# Patient Record
Sex: MALE | Race: BLACK OR AFRICAN AMERICAN | NOT HISPANIC OR LATINO | Employment: OTHER | URBAN - NONMETROPOLITAN AREA
[De-identification: names, ages, dates, MRNs, and addresses within clinical notes are randomized per-mention and may not be internally consistent; named-entity substitution may affect disease eponyms.]

---

## 2021-10-11 ENCOUNTER — OFFICE VISIT (OUTPATIENT)
Dept: FAMILY MEDICINE | Facility: CLINIC | Age: 76
End: 2021-10-11
Payer: MEDICARE

## 2021-10-11 VITALS
HEIGHT: 74 IN | TEMPERATURE: 97 F | BODY MASS INDEX: 27.46 KG/M2 | SYSTOLIC BLOOD PRESSURE: 133 MMHG | OXYGEN SATURATION: 95 % | RESPIRATION RATE: 18 BRPM | WEIGHT: 214 LBS | HEART RATE: 65 BPM | DIASTOLIC BLOOD PRESSURE: 83 MMHG

## 2021-10-11 DIAGNOSIS — J30.9 CHRONIC ALLERGIC RHINITIS: Primary | ICD-10-CM

## 2021-10-11 PROCEDURE — 99214 OFFICE O/P EST MOD 30 MIN: CPT | Mod: ,,, | Performed by: FAMILY MEDICINE

## 2021-10-11 PROCEDURE — 99214 PR OFFICE/OUTPT VISIT, EST, LEVL IV, 30-39 MIN: ICD-10-PCS | Mod: ,,, | Performed by: FAMILY MEDICINE

## 2021-10-11 RX ORDER — LATANOPROST 50 UG/ML
SOLUTION/ DROPS OPHTHALMIC
COMMUNITY
Start: 2021-05-24

## 2021-10-11 RX ORDER — MONTELUKAST SODIUM 10 MG/1
10 TABLET ORAL NIGHTLY
Qty: 30 TABLET | Refills: 0 | Status: SHIPPED | OUTPATIENT
Start: 2021-10-11 | End: 2021-11-10

## 2021-10-11 RX ORDER — AMLODIPINE BESYLATE 10 MG/1
10 TABLET ORAL DAILY
COMMUNITY
Start: 2021-07-30 | End: 2023-10-23

## 2021-10-11 RX ORDER — DORZOLAMIDE HYDROCHLORIDE AND TIMOLOL MALEATE 20; 5 MG/ML; MG/ML
SOLUTION/ DROPS OPHTHALMIC
COMMUNITY
Start: 2021-09-16

## 2023-10-23 ENCOUNTER — OFFICE VISIT (OUTPATIENT)
Dept: FAMILY MEDICINE | Facility: CLINIC | Age: 78
End: 2023-10-23
Payer: MEDICARE

## 2023-10-23 VITALS
SYSTOLIC BLOOD PRESSURE: 160 MMHG | HEART RATE: 56 BPM | OXYGEN SATURATION: 94 % | TEMPERATURE: 99 F | DIASTOLIC BLOOD PRESSURE: 88 MMHG | RESPIRATION RATE: 18 BRPM | HEIGHT: 74 IN | BODY MASS INDEX: 26.1 KG/M2 | WEIGHT: 203.38 LBS

## 2023-10-23 DIAGNOSIS — J01.11 ACUTE RECURRENT FRONTAL SINUSITIS: Primary | ICD-10-CM

## 2023-10-23 DIAGNOSIS — J02.9 SORE THROAT: ICD-10-CM

## 2023-10-23 DIAGNOSIS — Z20.822 COVID-19 VIRUS TEST RESULT UNKNOWN: ICD-10-CM

## 2023-10-23 DIAGNOSIS — R09.81 NASAL CONGESTION: ICD-10-CM

## 2023-10-23 LAB
CTP QC/QA: YES
FLUAV AG NPH QL: NEGATIVE
FLUBV AG NPH QL: NEGATIVE
SARS-COV-2 AG RESP QL IA.RAPID: NEGATIVE

## 2023-10-23 PROCEDURE — 96372 PR INJECTION,THERAP/PROPH/DIAG2ST, IM OR SUBCUT: ICD-10-PCS | Mod: ,,, | Performed by: NURSE PRACTITIONER

## 2023-10-23 PROCEDURE — 96372 THER/PROPH/DIAG INJ SC/IM: CPT | Mod: ,,, | Performed by: NURSE PRACTITIONER

## 2023-10-23 PROCEDURE — 99214 OFFICE O/P EST MOD 30 MIN: CPT | Mod: ,,, | Performed by: NURSE PRACTITIONER

## 2023-10-23 PROCEDURE — 99214 PR OFFICE/OUTPT VISIT, EST, LEVL IV, 30-39 MIN: ICD-10-PCS | Mod: ,,, | Performed by: NURSE PRACTITIONER

## 2023-10-23 PROCEDURE — 87428 SARSCOV & INF VIR A&B AG IA: CPT | Mod: RHCUB | Performed by: NURSE PRACTITIONER

## 2023-10-23 RX ORDER — FLUTICASONE PROPIONATE 50 MCG
2 SPRAY, SUSPENSION (ML) NASAL DAILY
Qty: 16 G | Refills: 0 | Status: SHIPPED | OUTPATIENT
Start: 2023-10-23

## 2023-10-23 RX ORDER — CEFTRIAXONE 1 G/1
1 INJECTION, POWDER, FOR SOLUTION INTRAMUSCULAR; INTRAVENOUS
Status: COMPLETED | OUTPATIENT
Start: 2023-10-23 | End: 2023-10-23

## 2023-10-23 RX ORDER — AMLODIPINE BESYLATE 2.5 MG/1
2.5 TABLET ORAL
COMMUNITY
Start: 2023-10-18

## 2023-10-23 RX ORDER — METFORMIN HYDROCHLORIDE 500 MG/1
500 TABLET ORAL
COMMUNITY
Start: 2023-10-13

## 2023-10-23 RX ADMIN — CEFTRIAXONE 1 G: 1 INJECTION, POWDER, FOR SOLUTION INTRAMUSCULAR; INTRAVENOUS at 11:10

## 2023-10-23 NOTE — PROGRESS NOTES
"   Johana López DNP, MARILU    [address] [place] [address]     PATIENT NAME: Carlitos Castle  : 1945  DATE: 10/23/23  MRN: [id number]      Billing Provider: Johana López DNP, FNP  Level of Service:   Patient PCP Information       Provider PCP Type    Darwin Hatfield MD General            Reason for Visit / Chief Complaint: Sinus Problem (Complains of sinus headache, nasal congestion, runny nose in the morning. Denies cough, sore throat, and fever. States he "feels bad sometimes.")       Update PCP  Update Chief Complaint         History of Present Illness / Problem Focused Workflow     Carlitos Castle presents to the clinic with Sinus Problem (Complains of sinus headache, nasal congestion, runny nose in the morning. Denies cough, sore throat, and fever. States he "feels bad sometimes.")     Sinus Problem  Associated symptoms include congestion. Pertinent negatives include no chills, coughing, ear pain, headaches, shortness of breath or sore throat.       Review of Systems     Review of Systems   Constitutional:  Negative for activity change, appetite change, chills, fatigue and fever.   HENT:  Positive for nasal congestion, postnasal drip and rhinorrhea. Negative for ear pain, hearing loss and sore throat.    Respiratory:  Negative for cough, chest tightness, shortness of breath and wheezing.    Cardiovascular:  Negative for chest pain, palpitations, leg swelling and claudication.   Gastrointestinal:  Negative for abdominal pain, change in bowel habit, constipation, diarrhea, nausea and vomiting.   Genitourinary:  Negative for dysuria.   Musculoskeletal:  Negative for arthralgias, back pain, gait problem and myalgias.   Integumentary:  Negative for rash.   Neurological:  Negative for weakness and headaches.   Psychiatric/Behavioral:  Negative for suicidal ideas. The patient is not nervous/anxious.         Medical / Social / Family History     Past Medical " "History:   Diagnosis Date    Glaucoma     Hypertension        History reviewed. No pertinent surgical history.    Social History  Mr. Carlitos Castle  reports that he has been smoking cigars. He has been exposed to tobacco smoke. His smokeless tobacco use includes chew. He reports current alcohol use. He reports that he does not currently use drugs.    Family History  Mr. Carlitos Castle's family history includes Cancer in his father and mother.    Medications and Allergies     Medications  Outpatient Medications Marked as Taking for the 10/23/23 encounter (Office Visit) with Johana López DNP, FNP   Medication Sig Dispense Refill    amLODIPine (NORVASC) 2.5 MG tablet Take 2.5 mg by mouth.      dorzolamide-timolol 2-0.5% (COSOPT) 22.3-6.8 mg/mL ophthalmic solution       latanoprost 0.005 % ophthalmic solution       metFORMIN (GLUCOPHAGE) 500 MG tablet Take 500 mg by mouth.       Current Facility-Administered Medications for the 10/23/23 encounter (Office Visit) with Johana López DNP, FNP   Medication Dose Route Frequency Provider Last Rate Last Admin    [COMPLETED] cefTRIAXone injection 1 g  1 g Intramuscular 1 time in Clinic/HOD Johana López DNP, FNP   1 g at 10/23/23 1124       Allergies  Review of patient's allergies indicates:  No Known Allergies    Physical Examination     Vitals:    10/23/23 1029 10/23/23 1037   BP: (!) 176/88 (!) 160/88   BP Location: Right arm Left arm   Patient Position: Sitting Sitting   BP Method: Medium (Automatic) Large (Automatic)   Pulse: (!) 56    Resp: 18    Temp: 98.5 °F (36.9 °C)    TempSrc: Oral    SpO2: (!) 94%    Weight: 92.3 kg (203 lb 6.4 oz)    Height: 6' 2" (1.88 m)      Physical Exam  Vitals and nursing note reviewed.   Constitutional:       General: He is not in acute distress.     Appearance: Normal appearance. He is not ill-appearing.   HENT:      Nose: Congestion and rhinorrhea present.      Mouth/Throat:      Pharynx: No posterior oropharyngeal erythema.   Eyes: "      Extraocular Movements: Extraocular movements intact.      Pupils: Pupils are equal, round, and reactive to light.   Cardiovascular:      Rate and Rhythm: Normal rate and regular rhythm.      Heart sounds: Normal heart sounds.   Pulmonary:      Effort: Pulmonary effort is normal.      Breath sounds: Normal breath sounds.   Abdominal:      General: Bowel sounds are normal.      Palpations: Abdomen is soft.   Musculoskeletal:         General: Normal range of motion.   Skin:     Findings: No rash.   Neurological:      General: No focal deficit present.      Mental Status: He is alert and oriented to person, place, and time. Mental status is at baseline.   Psychiatric:         Mood and Affect: Mood normal.         Behavior: Behavior normal.          Assessment and Plan (including Health Maintenance)      Problem List  [employer]  Document Outside HM   :    Plan:         Health Maintenance Due   Topic Date Due    Hepatitis C Screening  Never done    Lipid Panel  Never done    COVID-19 Vaccine (1) Never done    Pneumococcal Vaccines (Age 65+) (1 - PCV) Never done    TETANUS VACCINE  Never done    Shingles Vaccine (1 of 2) Never done    Influenza Vaccine (1) Never done       Problem List Items Addressed This Visit    None  Visit Diagnoses       Acute recurrent frontal sinusitis    -  Primary    Relevant Medications    cefTRIAXone injection 1 g (Completed)    chlorpheniramine-phenylephrine 4-10 mg per tablet    fluticasone propionate (FLONASE) 50 mcg/actuation nasal spray    Sore throat        Nasal congestion        Relevant Orders    POCT SARS-COV2 (COVID) with Flu Antigen (Completed)    COVID-19 virus test result unknown        Relevant Orders    POCT SARS-COV2 (COVID) with Flu Antigen (Completed)          Acute recurrent frontal sinusitis  -     cefTRIAXone injection 1 g  -     chlorpheniramine-phenylephrine 4-10 mg per tablet; Take 1 tablet by mouth 3 (three) times daily as needed for Congestion.  Dispense: 30  tablet; Refill: 0  -     fluticasone propionate (FLONASE) 50 mcg/actuation nasal spray; 2 sprays (100 mcg total) by Each Nostril route once daily.  Dispense: 16 g; Refill: 0    Sore throat    Nasal congestion  -     POCT SARS-COV2 (COVID) with Flu Antigen    COVID-19 virus test result unknown  -     POCT SARS-COV2 (COVID) with Flu Antigen       The patient has no Health Maintenance topics of status Not Due        No future appointments.     Follow up if symptoms worsen or fail to improve.     Signature:  Johana López DNP, FNP  [address] [place] [address]  Phone #: [phone]  Fax #: [phone]    Date of encounter: 10/23/23    Patient Instructions   Increase fluid intake. Take meds as prescribed. Follow up if no improvement in 5-7 days.

## 2023-12-21 ENCOUNTER — OFFICE VISIT (OUTPATIENT)
Dept: FAMILY MEDICINE | Facility: CLINIC | Age: 78
End: 2023-12-21
Payer: MEDICARE

## 2023-12-21 VITALS
HEIGHT: 74 IN | WEIGHT: 202 LBS | HEART RATE: 56 BPM | OXYGEN SATURATION: 97 % | DIASTOLIC BLOOD PRESSURE: 77 MMHG | BODY MASS INDEX: 25.93 KG/M2 | TEMPERATURE: 98 F | SYSTOLIC BLOOD PRESSURE: 149 MMHG

## 2023-12-21 DIAGNOSIS — J30.9 CHRONIC ALLERGIC RHINITIS: Primary | ICD-10-CM

## 2023-12-21 PROCEDURE — 99213 PR OFFICE/OUTPT VISIT, EST, LEVL III, 20-29 MIN: ICD-10-PCS | Mod: ,,, | Performed by: NURSE PRACTITIONER

## 2023-12-21 PROCEDURE — 99213 OFFICE O/P EST LOW 20 MIN: CPT | Mod: ,,, | Performed by: NURSE PRACTITIONER

## 2023-12-21 RX ORDER — AZELASTINE 1 MG/ML
1 SPRAY, METERED NASAL 2 TIMES DAILY
Qty: 30 ML | Refills: 0 | Status: SHIPPED | OUTPATIENT
Start: 2023-12-21 | End: 2024-12-20

## 2023-12-21 NOTE — PATIENT INSTRUCTIONS
Blow nose often  Stay well hydrated with water being best  Use Eloisa pot or Saline nasal wash often to help remove nasal secretion and reduce swelling of nares  Consider local honey 2 teaspoons daily to help reduce allergies--- be sure to change honey with seasons (light sweet honey is spring/summer and darker bitter honey is fall/winter)  Pillows and blankets not washed in washing machine, place in dryer and run on hot setting for 10 minutes once a week to reduce allergens on bed linens.   Also dust ceiling fans weekly or any other fan in bedroom  Vacuum or sweep and mop bedroom floor every 3-4 days to help reduce allergens

## 2023-12-21 NOTE — PROGRESS NOTES
MARILU Sky    [address] [place] [address]     PATIENT NAME: Carlitos Castle  : 1945  DATE: 23  MRN: [id number]      Billing Provider: MARILU Sky  Level of Service: SD OFFICE/OUTPT VISIT, EST, LEVL III, 20-29 MIN    ASSESSMENT AND PLAN:      Problem List Items Addressed This Visit          ENT    Chronic allergic rhinitis - Primary    Relevant Medications    azelastine (ASTELIN) 137 mcg (0.1 %) nasal spray       The patient has no Health Maintenance topics of status Not Due  Future Appointments   Date Time Provider Department Center   2024  8:00 AM Yolis Prabhakar FNP [facility]      No follow-ups on file. or sooner as needed.      CHIEF COMPLAINT: Sinus Problem (Patient state he has chronic sinus issues. He has sinus drainage every morning. He state the nasal drainage was yellow and red on yesterday morning. ) and Nasal Congestion (Patient state when he lay down at night he get very congested. )           HISTORY OF PRESENT ILLNESS:  Carlitos Castle is a 78 y.o. male who presents to the clinic today     Carlitos Csatle presents to the clinic with Sinus Problem (Patient state he has chronic sinus issues. He has sinus drainage every morning. He state the nasal drainage was yellow and red on yesterday morning. ) and Nasal Congestion (Patient state when he lay down at night he get very congested. )     Reporting chronic AR with trial of many things and nose sprays without improvement  Out of daily meds one week ago and use of dry heat due to weather change caused nasal congestion and pressure around nose  Denies exposure to covid/flu  Denies fever/ chills/ st/cough      Sinus Problem  This is a chronic problem. The current episode started in the past 7 days. The problem has been waxing and waning since onset. There has been no fever. His pain is at a severity of 3/10. The pain is mild. Associated symptoms include  "congestion, sinus pressure and sneezing. Past treatments include nothing. The treatment provided no relief.       PAST MEDICAL HISTORY:      Past Medical History:   Diagnosis Date    Glaucoma     Hypertension      PAST SURGICAL HISTORY:  History reviewed. No pertinent surgical history.  SOCIAL HISTORY:  Social History     Socioeconomic History    Marital status:    Tobacco Use    Smoking status: Some Days     Types: Cigars     Passive exposure: Current    Smokeless tobacco: Current     Types: Chew    Tobacco comments:     "Every once in a while" he will have a cigar. States "not very often."   Substance and Sexual Activity    Alcohol use: Yes     Comment: socially    Drug use: Not Currently    Sexual activity: Not Currently     FAMILY HISTORY:       Family History   Problem Relation Age of Onset    Cancer Mother     Cancer Father        ALLERGIES AND MEDICATIONS: updated and reviewed.       Review of patient's allergies indicates:  No Known Allergies  Medication List with Changes/Refills   New Medications    AZELASTINE (ASTELIN) 137 MCG (0.1 %) NASAL SPRAY    1 spray (137 mcg total) by Nasal route 2 (two) times daily.   Current Medications    AMLODIPINE (NORVASC) 2.5 MG TABLET    Take 2.5 mg by mouth.    DORZOLAMIDE-TIMOLOL 2-0.5% (COSOPT) 22.3-6.8 MG/ML OPHTHALMIC SOLUTION        FLUTICASONE PROPIONATE (FLONASE) 50 MCG/ACTUATION NASAL SPRAY    2 sprays (100 mcg total) by Each Nostril route once daily.    LATANOPROST 0.005 % OPHTHALMIC SOLUTION        METFORMIN (GLUCOPHAGE) 500 MG TABLET    Take 500 mg by mouth.       SCREENING HISTORY:     Health Maintenance         Date Due Completion Date    Hepatitis C Screening Never done ---    Lipid Panel Never done ---    COVID-19 Vaccine (1) Never done ---    Pneumococcal Vaccines (Age 65+) (1 - PCV) Never done ---    TETANUS VACCINE Never done ---    Shingles Vaccine (1 of 2) Never done ---    RSV Vaccine (Age 60+ and Pregnant patients) (1 - 1-dose 60+ series) Never " "done ---    Influenza Vaccine (1) Never done ---            REVIEW OF SYSTEMS:   Review of Systems   HENT:  Positive for nasal congestion, sinus pressure/congestion and sneezing.          PHYSICAL EXAM:         BP (!) 149/77 (BP Location: Left arm, Patient Position: Sitting)   Pulse (!) 56   Temp 97.9 °F (36.6 °C) (Oral)   Ht 6' 2" (1.88 m)   Wt 91.6 kg (202 lb)   SpO2 97%   BMI 25.94 kg/m²  No LMP for male patient.  Wt Readings from Last 3 Encounters:   12/21/23 91.6 kg (202 lb)   10/23/23 92.3 kg (203 lb 6.4 oz)   10/11/21 97.1 kg (214 lb)     BP Readings from Last 3 Encounters:   12/21/23 (!) 149/77   10/23/23 (!) 160/88   10/11/21 133/83     Estimated body mass index is 25.94 kg/m² as calculated from the following:    Height as of this encounter: 6' 2" (1.88 m).    Weight as of this encounter: 91.6 kg (202 lb).     Physical Exam  Vitals reviewed.   HENT:      Head: Normocephalic.      Right Ear: Tympanic membrane normal.      Left Ear: Tympanic membrane normal.      Nose: Congestion present.      Comments: Erythremic hypertrophic turbinate on left       Mouth/Throat:      Mouth: Mucous membranes are moist.   Eyes:      Pupils: Pupils are equal, round, and reactive to light.   Cardiovascular:      Rate and Rhythm: Normal rate and regular rhythm.      Pulses: Normal pulses.   Pulmonary:      Effort: Pulmonary effort is normal.      Breath sounds: Normal breath sounds.   Musculoskeletal:      Cervical back: Normal range of motion and neck supple.   Skin:     General: Skin is warm and dry.      Capillary Refill: Capillary refill takes less than 2 seconds.   Neurological:      Mental Status: He is alert and oriented to person, place, and time.         LABS:     I have reviewed old labs below:  No results found for: "WBC", "HGB", "HCT", "MCV", "PLT", "NA", "K", "CL", "CALCIUM", "PHOS", "CO2", "GLU", "BUN", "CREATININE", "ANIONGAP", "ESTGFRAFRICA", "EGFRNONAA", "PROT", "ALBUMIN", "BILITOT", "ALKPHOS", "ALT", " ""AST", "INR", "CHOL", "TRIG", "HDL", "LDLCALC", "TSH", "PSA", "GLUF", "HGBA1C", "MICROALBUR"        Signature:  Yolis Prabhakar [address] [place] [address]  Phone #: [phone]  Fax #: [phone]       Date of encounter: 12/21/23    Patient Instructions   Blow nose often  Stay well hydrated with water being best  Use Eloisa pot or Saline nasal wash often to help remove nasal secretion and reduce swelling of nares  Consider local honey 2 teaspoons daily to help reduce allergies--- be sure to change honey with seasons (light sweet honey is spring/summer and darker bitter honey is fall/winter)  Pillows and blankets not washed in washing machine, place in dryer and run on hot setting for 10 minutes once a week to reduce allergens on bed linens.   Also dust ceiling fans weekly or any other fan in bedroom  Vacuum or sweep and mop bedroom floor every 3-4 days to help reduce allergens       "

## 2024-04-15 ENCOUNTER — OFFICE VISIT (OUTPATIENT)
Dept: FAMILY MEDICINE | Facility: CLINIC | Age: 79
End: 2024-04-15
Payer: MEDICARE

## 2024-04-15 VITALS
WEIGHT: 205.63 LBS | SYSTOLIC BLOOD PRESSURE: 138 MMHG | BODY MASS INDEX: 26.39 KG/M2 | OXYGEN SATURATION: 98 % | HEART RATE: 78 BPM | DIASTOLIC BLOOD PRESSURE: 79 MMHG | HEIGHT: 74 IN | TEMPERATURE: 97 F | RESPIRATION RATE: 16 BRPM

## 2024-04-15 DIAGNOSIS — S60.410A ABRASION OF RIGHT INDEX FINGER, INITIAL ENCOUNTER: ICD-10-CM

## 2024-04-15 DIAGNOSIS — S61.219A CUT OF FINGER: Primary | ICD-10-CM

## 2024-04-15 DIAGNOSIS — Z23 ENCOUNTER FOR IMMUNIZATION: ICD-10-CM

## 2024-04-15 PROCEDURE — 99212 OFFICE O/P EST SF 10 MIN: CPT | Mod: ,,, | Performed by: FAMILY MEDICINE

## 2024-04-15 PROCEDURE — 90715 TDAP VACCINE 7 YRS/> IM: CPT | Mod: ,,, | Performed by: FAMILY MEDICINE

## 2024-04-15 PROCEDURE — 90471 IMMUNIZATION ADMIN: CPT | Mod: ,,, | Performed by: FAMILY MEDICINE

## 2024-04-15 NOTE — PROGRESS NOTES
Darwin Hatfield MD    [facility]  [address] [place] [address] [place] [address]     PATIENT NAME: Carlitos Castle  : 1945  DATE: 4/15/24  MRN: [id number]      Billing Provider: Darwin Hatfield MD  Level of Service: CT OFFICE/OUTPT VISIT, EST, LEVL II, 10-19 MIN  Patient PCP Information       Provider PCP Type    Darwin Hatfield MD General            Reason for Visit / Chief Complaint: Hand Injury (Index finger on right hand cut on new tin today. He takes an aspirin daily so finger bleed.)       Update PCP  Update Chief Complaint         History of Present Illness / Problem Focused Workflow     Carlitos Castle presents to the clinic with Hand Injury (Index finger on right hand cut on new tin today. He takes an aspirin daily so finger bleed.)     HPI    Review of Systems     Review of Systems   Constitutional:  Negative for activity change, appetite change, chills, fatigue and fever.   HENT:  Negative for nasal congestion, ear pain, hearing loss, postnasal drip and sore throat.    Respiratory:  Negative for cough, chest tightness, shortness of breath and wheezing.    Cardiovascular:  Negative for chest pain, palpitations, leg swelling and claudication.   Gastrointestinal:  Negative for abdominal pain, change in bowel habit, constipation, diarrhea, nausea and vomiting.   Genitourinary:  Negative for dysuria.   Musculoskeletal:  Negative for arthralgias, back pain, gait problem and myalgias.   Integumentary:  Positive for wound. Negative for rash.   Neurological:  Negative for weakness and headaches.   Psychiatric/Behavioral:  Negative for suicidal ideas. The patient is not nervous/anxious.         Medical / Social / Family History     Past Medical History:   Diagnosis Date    Glaucoma     Hypertension        No past surgical history on file.    Social History    reports that he has been smoking cigars. He has been exposed to tobacco smoke. His smokeless tobacco use includes chew.  He reports current alcohol use. He reports that he does not currently use drugs.    Family History  's family history includes Cancer in his father and mother.    Medications and Allergies     Medications  Current Outpatient Medications   Medication Sig Dispense Refill    amLODIPine (NORVASC) 2.5 MG tablet Take 2.5 mg by mouth.      azelastine (ASTELIN) 137 mcg (0.1 %) nasal spray 1 spray (137 mcg total) by Nasal route 2 (two) times daily. 30 mL 0    dorzolamide-timolol 2-0.5% (COSOPT) 22.3-6.8 mg/mL ophthalmic solution       latanoprost 0.005 % ophthalmic solution       metFORMIN (GLUCOPHAGE) 500 MG tablet Take 500 mg by mouth.      fluticasone propionate (FLONASE) 50 mcg/actuation nasal spray 2 sprays (100 mcg total) by Each Nostril route once daily. (Patient not taking: Reported on 12/21/2023) 16 g 0     No current facility-administered medications for this visit.       Allergies  Review of patient's allergies indicates:  No Known Allergies    Physical Examination     Vitals:    04/15/24 1559   BP: 138/79   Pulse: 78   Resp: 16   Temp: 97.1 °F (36.2 °C)     Physical Exam  Constitutional:       Appearance: Normal appearance.   Cardiovascular:      Rate and Rhythm: Normal rate and regular rhythm.   Pulmonary:      Effort: Pulmonary effort is normal.   Musculoskeletal:        Hands:       Comments: U shaped wound    Neurological:      Mental Status: He is alert.            Assessment and Plan (including Health Maintenance)      Problem List  [employer]  Document Outside HM   :    Plan:     He will go to the ER for evaluation and possible sutures     Health Maintenance Due   Topic Date Due    Hepatitis C Screening  Never done    Lipid Panel  Never done    Pneumococcal Vaccines (Age 65+) (1 of 2 - PCV) Never done    Shingles Vaccine (1 of 2) Never done    RSV Vaccine (Age 60+ and Pregnant patients) (1 - 1-dose 60+ series) Never done    Influenza Vaccine (1) Never done    COVID-19 Vaccine (1 - 2023-24 season) Never  done       Problem List Items Addressed This Visit    None  Visit Diagnoses       Cut of finger    -  Primary    Relevant Orders    (In Office Administered) Tdap Vaccine (Completed)    Encounter for immunization        Relevant Orders    (In Office Administered) Tdap Vaccine (Completed)    Abrasion of right index finger, initial encounter        Relevant Orders    (In Office Administered) Tdap Vaccine (Completed)            Health Maintenance Topics with due status: Not Due       Topic Last Completion Date    TETANUS VACCINE 04/15/2024       Procedures     No future appointments.     No follow-ups on file.     Signature:  Darwin Hatfield MD  [address] [place] [address]  Phone #: [phone]  Fax #: [phone]    Date of encounter: 4/15/24    There are no Patient Instructions on file for this visit.

## 2024-09-25 ENCOUNTER — OFFICE VISIT (OUTPATIENT)
Dept: FAMILY MEDICINE | Facility: CLINIC | Age: 79
End: 2024-09-25
Payer: MEDICARE

## 2024-09-25 VITALS
HEART RATE: 69 BPM | BODY MASS INDEX: 26.95 KG/M2 | OXYGEN SATURATION: 96 % | RESPIRATION RATE: 18 BRPM | WEIGHT: 210 LBS | SYSTOLIC BLOOD PRESSURE: 181 MMHG | HEIGHT: 74 IN | DIASTOLIC BLOOD PRESSURE: 80 MMHG | TEMPERATURE: 98 F

## 2024-09-25 DIAGNOSIS — R09.81 NASAL CONGESTION: ICD-10-CM

## 2024-09-25 DIAGNOSIS — N40.1 BENIGN PROSTATIC HYPERPLASIA WITH NOCTURIA: ICD-10-CM

## 2024-09-25 DIAGNOSIS — N52.9 ERECTILE DISORDER: Primary | ICD-10-CM

## 2024-09-25 DIAGNOSIS — R35.1 BENIGN PROSTATIC HYPERPLASIA WITH NOCTURIA: ICD-10-CM

## 2024-09-25 DIAGNOSIS — Z20.822 ENCOUNTER FOR LABORATORY TESTING FOR COVID-19 VIRUS: ICD-10-CM

## 2024-09-25 LAB
CTP QC/QA: YES
CTP QC/QA: YES
POC MOLECULAR INFLUENZA A AGN: NEGATIVE
POC MOLECULAR INFLUENZA B AGN: NEGATIVE
SARS-COV-2 RDRP RESP QL NAA+PROBE: NEGATIVE

## 2024-09-25 PROCEDURE — 87502 INFLUENZA DNA AMP PROBE: CPT | Mod: RHCUB | Performed by: NURSE PRACTITIONER

## 2024-09-25 PROCEDURE — 99214 OFFICE O/P EST MOD 30 MIN: CPT | Mod: ,,, | Performed by: NURSE PRACTITIONER

## 2024-09-25 PROCEDURE — 87635 SARS-COV-2 COVID-19 AMP PRB: CPT | Mod: RHCUB | Performed by: NURSE PRACTITIONER

## 2024-09-25 RX ORDER — BRIMONIDINE TARTRATE 2 MG/ML
1 SOLUTION/ DROPS OPHTHALMIC 2 TIMES DAILY
COMMUNITY
Start: 2024-04-24

## 2024-09-25 RX ORDER — MAGNESIUM 200 MG
TABLET ORAL
COMMUNITY

## 2024-09-25 RX ORDER — TESTOSTERONE CYPIONATE 200 MG/ML
200 INJECTION, SOLUTION INTRAMUSCULAR
COMMUNITY

## 2024-09-25 RX ORDER — TADALAFIL 20 MG/1
20 TABLET ORAL DAILY PRN
Qty: 12 TABLET | Refills: 2 | Status: SHIPPED | OUTPATIENT
Start: 2024-09-25 | End: 2025-09-25

## 2024-09-25 RX ORDER — TADALAFIL 5 MG/1
5 TABLET ORAL DAILY
Qty: 30 TABLET | Refills: 2 | Status: SHIPPED | OUTPATIENT
Start: 2024-09-25 | End: 2025-09-25

## 2024-09-25 RX ORDER — FLUTICASONE PROPIONATE 50 MCG
2 SPRAY, SUSPENSION (ML) NASAL DAILY
Qty: 16 G | Refills: 0 | Status: SHIPPED | OUTPATIENT
Start: 2024-09-25

## 2024-09-25 RX ORDER — PREDNISOLONE ACETATE 10 MG/ML
SUSPENSION/ DROPS OPHTHALMIC
COMMUNITY
Start: 2024-04-09

## 2024-09-25 NOTE — PROGRESS NOTES
Johana López DNP, MARILU    [address] [place] [address]     PATIENT NAME: Carlitos Castle  : 1945  DATE: 24  MRN: [id number]      Billing Provider: Johana López DNP, MARILU  Level of Service:   Patient PCP Information       Provider PCP Type    Grady Jiménez MD General            Reason for Visit / Chief Complaint: Nasal Congestion (Pt has continuing congestion that he says is mainly at night.  He says this has been a problem for years)       Update PCP  Update Chief Complaint         History of Present Illness / Problem Focused Workflow     Carlitos Castle presents to the clinic with Nasal Congestion (Pt has continuing congestion that he says is mainly at night.  He says this has been a problem for years)     Pt also c/o erectile dysfunction.     Review of Systems     Review of Systems   Constitutional:  Negative for activity change, appetite change, chills, fatigue and fever.   HENT:  Positive for nasal congestion, rhinorrhea and sinus pressure/congestion. Negative for ear pain, hearing loss, postnasal drip and sore throat.    Respiratory:  Negative for cough, chest tightness, shortness of breath and wheezing.    Cardiovascular:  Negative for chest pain, palpitations, leg swelling and claudication.   Gastrointestinal:  Negative for abdominal pain, change in bowel habit, constipation, diarrhea, nausea and vomiting.   Genitourinary:  Positive for erectile dysfunction. Negative for dysuria.   Musculoskeletal:  Negative for arthralgias, back pain, gait problem and myalgias.   Integumentary:  Negative for rash.   Neurological:  Negative for weakness and headaches.   Psychiatric/Behavioral:  Negative for suicidal ideas. The patient is not nervous/anxious.         Medical / Social / Family History     Past Medical History:   Diagnosis Date    Glaucoma     Hypertension        History reviewed. No pertinent surgical history.    Social History  Mr. Carlitos Castle  reports  "that he has been smoking cigars. He has been exposed to tobacco smoke. His smokeless tobacco use includes chew. He reports current alcohol use. He reports that he does not currently use drugs.    Family History  Mr. Carlitos Castle's family history includes Cancer in his father and mother.    Medications and Allergies     Medications  Outpatient Medications Marked as Taking for the 9/25/24 encounter (Office Visit) with Johana López, ERNESTO, FNP   Medication Sig Dispense Refill    amLODIPine (NORVASC) 2.5 MG tablet Take 2.5 mg by mouth.      brimonidine 0.2% (ALPHAGAN) 0.2 % Drop Place 1 drop into the right eye 2 (two) times daily.      dorzolamide-timolol 2-0.5% (COSOPT) 22.3-6.8 mg/mL ophthalmic solution       fluticasone propionate (FLONASE) 50 mcg/actuation nasal spray 2 sprays (100 mcg total) by Each Nostril route once daily. 16 g 0    latanoprost 0.005 % ophthalmic solution       magnesium 200 mg Tab as directed Orally      metFORMIN (GLUCOPHAGE) 500 MG tablet Take 500 mg by mouth.      prednisoLONE acetate (PRED FORTE) 1 % DrpS Place into the left eye.      testosterone cypionate (DEPOTESTOTERONE CYPIONATE) 200 mg/mL injection Inject 200 mg into the muscle every 7 days.         Allergies  Review of patient's allergies indicates:  No Known Allergies    Physical Examination     Vitals:    09/25/24 0843 09/25/24 0847 09/25/24 0952   BP: (!) 171/72 (!) 142/75 (!) 181/80  Comment: pt to follow up with pcp   BP Location: Left arm Right arm Left arm   Patient Position: Sitting Sitting Sitting   BP Method: Large (Automatic) Large (Automatic) Large (Automatic)   Pulse: 69     Resp: 18     Temp: 98.4 °F (36.9 °C)     TempSrc: Oral     SpO2: 96%     Weight: 95.3 kg (210 lb)     Height: 6' 2" (1.88 m)       Physical Exam  Vitals and nursing note reviewed.   Constitutional:       General: He is not in acute distress.     Appearance: Normal appearance. He is normal weight.   HENT:      Nose: Congestion and rhinorrhea present. "      Mouth/Throat:      Mouth: Mucous membranes are moist.   Eyes:      Pupils: Pupils are equal, round, and reactive to light.   Cardiovascular:      Rate and Rhythm: Normal rate and regular rhythm.      Pulses: Normal pulses.      Heart sounds: No murmur heard.  Pulmonary:      Effort: Pulmonary effort is normal. No respiratory distress.      Breath sounds: Normal breath sounds. No wheezing, rhonchi or rales.   Chest:      Chest wall: No tenderness.   Abdominal:      General: Bowel sounds are normal. There is no distension.      Palpations: Abdomen is soft.      Tenderness: There is no abdominal tenderness. There is no right CVA tenderness, left CVA tenderness, guarding or rebound.   Musculoskeletal:         General: No swelling or tenderness. Normal range of motion.      Cervical back: Normal range of motion and neck supple.      Right lower leg: No edema.      Left lower leg: No edema.   Skin:     General: Skin is warm and dry.   Neurological:      General: No focal deficit present.      Mental Status: He is alert and oriented to person, place, and time.   Psychiatric:         Mood and Affect: Mood normal.         Behavior: Behavior normal.         Thought Content: Thought content normal.         Judgment: Judgment normal.          Assessment and Plan (including Health Maintenance)      Problem List  Smart Sets  Document Outside HM   :    Plan:         Health Maintenance Due   Topic Date Due    Hepatitis C Screening  Never done    Lipid Panel  Never done    Pneumococcal Vaccines (Age 65+) (1 of 2 - PCV) Never done    Shingles Vaccine (1 of 2) Never done    RSV Vaccine (Age 60+ and Pregnant patients) (1 - 1-dose 60+ series) Never done    Influenza Vaccine (1) Never done    COVID-19 Vaccine (1 - 2023-24 season) Never done       Problem List Items Addressed This Visit    None  Visit Diagnoses       Erectile disorder    -  Primary    Relevant Medications    tadalafiL (CIALIS) 20 MG Tab    Nasal congestion         Relevant Medications    chlorpheniramine-phenylephrine 4-10 mg per tablet    fluticasone propionate (FLONASE) 50 mcg/actuation nasal spray    Other Relevant Orders    POCT Influenza A/B Molecular (Completed)    Encounter for laboratory testing for COVID-19 virus        Relevant Orders    POCT COVID-19 Rapid Screening (Completed)    Benign prostatic hyperplasia with nocturia        Relevant Medications    tadalafiL (CIALIS) 5 MG tablet          Erectile disorder  -     tadalafiL (CIALIS) 20 MG Tab; Take 1 tablet (20 mg total) by mouth daily as needed (erectile dysfunction).  Dispense: 12 tablet; Refill: 2    Nasal congestion  -     POCT Influenza A/B Molecular  -     chlorpheniramine-phenylephrine 4-10 mg per tablet; Take 1 tablet by mouth 3 (three) times daily as needed for Congestion.  Dispense: 30 tablet; Refill: 0  -     fluticasone propionate (FLONASE) 50 mcg/actuation nasal spray; 2 sprays (100 mcg total) by Each Nostril route once daily.  Dispense: 16 g; Refill: 0    Encounter for laboratory testing for COVID-19 virus  -     POCT COVID-19 Rapid Screening    Benign prostatic hyperplasia with nocturia  -     tadalafiL (CIALIS) 5 MG tablet; Take 1 tablet (5 mg total) by mouth Daily.  Dispense: 30 tablet; Refill: 2       Health Maintenance Topics with due status: Not Due       Topic Last Completion Date    TETANUS VACCINE 04/15/2024         Future Appointments   Date Time Provider Department Center   9/25/2024 11:40 AM Johana López DNP, FNP [facility] [place]        Follow up if symptoms worsen or fail to improve.     Signature:  Johana López DNP, FNP  [address] [place] [address]  Phone #: [phone]  Fax #: [phone]    Date of encounter: 9/25/24    Patient Instructions   Increase fluid intake. Take meds as prescribed. Follow up if no improvement in 5-7 days.  Instructed on proper use of nasal spray. Recommend cialis 5 mg daily for prostate and 20  mg as needed for erectile dysfunction. Monitor blood pressure for 2 weeks. Make sure that you have empty bladder, resting for 5 minutes, and feet flat on the floor.

## 2024-09-25 NOTE — PATIENT INSTRUCTIONS
Increase fluid intake. Take meds as prescribed. Follow up if no improvement in 5-7 days.  Instructed on proper use of nasal spray. Recommend cialis 5 mg daily for prostate and 20 mg as needed for erectile dysfunction. Monitor blood pressure for 2 weeks. Make sure that you have empty bladder, resting for 5 minutes, and feet flat on the floor.

## 2024-11-06 ENCOUNTER — TELEPHONE (OUTPATIENT)
Dept: ORTHOPEDICS | Facility: CLINIC | Age: 79
End: 2024-11-06
Payer: COMMERCIAL

## 2024-11-06 NOTE — TELEPHONE ENCOUNTER
----- Message from Macarena sent at 11/6/2024  8:30 AM CST -----      Who Called: Carlitos Castle    Caller is requesting a same day appointment. Caller declined first available appointment listed below.      When is the first available appointment?11/08    Symptoms or reason for appointment: Pain in left ankle. Can barely walk       Preferred Method of Contact: Phone Call  Patient's Preferred Phone Number on File: [phone] cell   [phone] landline   Best Call Back Number, if different:  Additional Information: